# Patient Record
(demographics unavailable — no encounter records)

---

## 2025-02-08 NOTE — ASSESSMENT
[FreeTextEntry1] : unclear nature of bladder dysfunction - family interested in an evaluation to help determine long term care now living in [place] i did discuss cystolithotripsy to remove the bladder stone at some point also noted potential benefit to SPT over durham   think he deserves a UroD  - will reach out to Neurourology colleagues

## 2025-02-08 NOTE — REASON FOR VISIT
[Initial Visit ___] : [unfilled] is here today for an initial visit  for [unfilled] [Parent] : parent [Family Member] : family member

## 2025-02-08 NOTE — HISTORY OF PRESENT ILLNESS
[FreeTextEntry1] : here for management of urinary retention. referred following recent admission for severe constipation. he experiences a serious brain injury aged 3 and has had a durham catheter for past 10 years. seems he was dribbling and retaining  - family at time in [place] nothing else to be done so Durham placed. Family has been changing themselves. no issues with UTIs family reports that patient has deteriorated in terms of no longer walks.  reviewed VCT with contrast performed at [facility]: normal upper tracts, diffusely thickened bladder wall and a 2cm bladder stone

## 2025-05-06 NOTE — PHYSICAL EXAM
[Normal Appearance] : normal appearance [Well Groomed] : well groomed [General Appearance - In No Acute Distress] : no acute distress [Urethral Meatus] : meatus normal [Normal Station and Gait] : the gait and station were normal for the patient's age [] : no rash [No Focal Deficits] : no focal deficits [Oriented To Time, Place, And Person] : oriented to person, place, and time [Affect] : the affect was normal [Mood] : the mood was normal [de-identified] : durham clear urine  [Chaperone Present] : A chaperone was present in the examining room during all aspects of the physical examination [FreeTextEntry2] : nasir ALBRECHT UDS nurse

## 2025-05-06 NOTE — REASON FOR VISIT
[TextEntry] : 36-year-old male who presents for neurogenic bladder  Patient is accompanied by family who provide history.  He has a history of neurogenic bladder from when he was a baby.  He was originally managed with an external catheter and then started develop episodes of retention with severe pain.  He switched to an indwelling catheter 10 years ago.  He has not had many issues with the catheter.  He does experience discomfort with exchanges.  They deny issues with hematuria and UTIs.  He is cared for by his family.  He was found to have a bladder stone recently and this was treated in 3/2025.  The family wonders if this was the etiology of his retention episodes.  Patient presents today for urodynamic testing to better understand bladder function.

## 2025-05-06 NOTE — ASSESSMENT
[FreeTextEntry1] : 36-year-old male with neurogenic bladder  A long discussion with the family regarding the urodynamics results.  The patient exhibited reflexive voiding.  He had small capacity (~20cc) with wet/terminal DO.   The catheter was replaced after the study.  The family is looking for a long-term solution with the patient.  We did discuss that the urodynamics suggests that he empties his bladder. We discussed options of returning to external catheter.  The family is nervous because he developed retention episodes years ago with associated pain and sweating.  We discussed teaching the family CIC with external catheter in the event he does have issues with drainage.    We then discussed continued Huynh catheter drainage.  However the patient does have discomfort with exchanges.  We discussed that suprapubic tubes are superior for chronic catheterization with regard to ease of exchange and comfort for the patient. They will consider all of this and follow up with Dr. Liz to discuss

## 2025-05-28 NOTE — ASSESSMENT
[FreeTextEntry1] : recommended reomoving durham and see how goes. may have issues with control but would hope/expect volumes of urine may increase with time. also reviewed SPT versus durham in longterm management if unable to void.

## 2025-05-28 NOTE — HISTORY OF PRESENT ILLNESS
[FreeTextEntry1] : here for management of urinary retention. referred following recent admission for severe constipation. he experiences a serious brain injury aged 3 and has had a durham catheter for past 10 years. seems he was dribbling and retaining  - family at time in [place] nothing else to be done so Durham placed. Family has been changing themselves. no issues with UTIs family reports that patient has deteriorated in terms of no longer walks.  reviewed VCT with contrast performed at [facility]: normal upper tracts, diffusely thickened bladder wall and a 2cm bladder stone   5/25 here to review UroDynamics. father notes much improve since removing bladder stone. less spasms and leaking @ the durham  study noted bladder function - C/W OAB and appropriate sphincter control voids at 100-150cc

## 2025-05-28 NOTE — HISTORY OF PRESENT ILLNESS
[FreeTextEntry1] : here for management of urinary retention. referred following recent admission for severe constipation. he experiences a serious brain injury aged 3 and has had a durham catheter for past 10 years. seems he was dribbling and retaining  - family at time in [place] nothing else to be done so Durham placed. Family has been changing themselves. no issues with UTIs family reports that patient has deteriorated in terms of no longer walks.  reviewed VCT with contrast performed at [facility]: normal upper tracts, diffusely thickened bladder wall and a 2cm bladder stone   5/25 here to review UroDynamics. father notes much improve since removing bladder stone. less spasms and leaking @ the durham  study noted bladder function - C/W OAB and appropriate sphincter control voids at 100-150cc   not examined

## 2025-07-11 NOTE — HISTORY OF PRESENT ILLNESS
[FreeTextEntry1] : here for management of urinary retention. referred following recent admission for severe constipation. he experiences a serious brain injury aged 3 and has had a durham catheter for past 10 years. seems he was dribbling and retaining  - family at time in [place] nothing else to be done so Durham placed. Family has been changing themselves. no issues with UTIs family reports that patient has deteriorated in terms of no longer walks.  reviewed VCT with contrast performed at [facility]: normal upper tracts, diffusely thickened bladder wall and a 2cm bladder stone   5/25 here to review UroDynamics. father notes much improve since removing bladder stone. less spasms and leaking @ the durham  study noted bladder function - C/W OAB and appropriate sphincter control voids at 100-150cc  7/25 after last visit recommended removing durham and seeing if can void. did not really void - dribbled some urine; also complained of bladder pressure and pain so relaced durham and pain etc resolved

## 2025-07-11 NOTE — ASSESSMENT
[FreeTextEntry1] : noted that UroD noted contractions but may not be ready to void on own. suggested giving him several chances with TOV and see. he has no issues with controlling his bowels.